# Patient Record
Sex: FEMALE | Race: WHITE
[De-identification: names, ages, dates, MRNs, and addresses within clinical notes are randomized per-mention and may not be internally consistent; named-entity substitution may affect disease eponyms.]

---

## 2018-08-30 ENCOUNTER — HOSPITAL ENCOUNTER (INPATIENT)
Dept: HOSPITAL 47 - EC | Age: 63
LOS: 2 days | Discharge: HOME | DRG: 291 | End: 2018-09-01
Attending: HOSPITALIST | Admitting: HOSPITALIST
Payer: MEDICARE

## 2018-08-30 VITALS — BODY MASS INDEX: 33.1 KG/M2

## 2018-08-30 DIAGNOSIS — M19.041: ICD-10-CM

## 2018-08-30 DIAGNOSIS — D72.829: ICD-10-CM

## 2018-08-30 DIAGNOSIS — Z95.1: ICD-10-CM

## 2018-08-30 DIAGNOSIS — E78.5: ICD-10-CM

## 2018-08-30 DIAGNOSIS — Z98.41: ICD-10-CM

## 2018-08-30 DIAGNOSIS — Z98.51: ICD-10-CM

## 2018-08-30 DIAGNOSIS — J96.01: ICD-10-CM

## 2018-08-30 DIAGNOSIS — Z98.42: ICD-10-CM

## 2018-08-30 DIAGNOSIS — I25.2: ICD-10-CM

## 2018-08-30 DIAGNOSIS — M19.031: ICD-10-CM

## 2018-08-30 DIAGNOSIS — I16.1: ICD-10-CM

## 2018-08-30 DIAGNOSIS — Z87.81: ICD-10-CM

## 2018-08-30 DIAGNOSIS — M19.032: ICD-10-CM

## 2018-08-30 DIAGNOSIS — I24.8: ICD-10-CM

## 2018-08-30 DIAGNOSIS — M19.042: ICD-10-CM

## 2018-08-30 DIAGNOSIS — Z79.01: ICD-10-CM

## 2018-08-30 DIAGNOSIS — Z79.899: ICD-10-CM

## 2018-08-30 DIAGNOSIS — Z87.891: ICD-10-CM

## 2018-08-30 DIAGNOSIS — I25.10: ICD-10-CM

## 2018-08-30 DIAGNOSIS — Z95.810: ICD-10-CM

## 2018-08-30 DIAGNOSIS — E87.2: ICD-10-CM

## 2018-08-30 DIAGNOSIS — I25.5: ICD-10-CM

## 2018-08-30 DIAGNOSIS — I11.0: Primary | ICD-10-CM

## 2018-08-30 DIAGNOSIS — Z96.1: ICD-10-CM

## 2018-08-30 DIAGNOSIS — Z79.4: ICD-10-CM

## 2018-08-30 DIAGNOSIS — Z88.8: ICD-10-CM

## 2018-08-30 DIAGNOSIS — E11.40: ICD-10-CM

## 2018-08-30 DIAGNOSIS — M16.11: ICD-10-CM

## 2018-08-30 DIAGNOSIS — I48.0: ICD-10-CM

## 2018-08-30 DIAGNOSIS — E11.65: ICD-10-CM

## 2018-08-30 DIAGNOSIS — I50.23: ICD-10-CM

## 2018-08-30 DIAGNOSIS — Z82.61: ICD-10-CM

## 2018-08-30 LAB
ALBUMIN SERPL-MCNC: 4.2 G/DL (ref 3.5–5)
ALP SERPL-CCNC: 115 U/L (ref 38–126)
ALT SERPL-CCNC: 43 U/L (ref 9–52)
ANION GAP SERPL CALC-SCNC: 16 MMOL/L
APTT BLD: 31.9 SEC (ref 22–30)
AST SERPL-CCNC: 43 U/L (ref 14–36)
BUN SERPL-SCNC: 17 MG/DL (ref 7–17)
CALCIUM SPEC-MCNC: 9.4 MG/DL (ref 8.4–10.2)
CELLS COUNTED: 100
CHLORIDE SERPL-SCNC: 102 MMOL/L (ref 98–107)
CK SERPL-CCNC: 145 U/L (ref 30–135)
CK SERPL-CCNC: 147 U/L (ref 30–135)
CK SERPL-CCNC: 173 U/L (ref 30–135)
CO2 SERPL-SCNC: 20 MMOL/L (ref 22–30)
EOSINOPHIL # BLD MANUAL: 0.14 K/UL (ref 0–0.7)
ERYTHROCYTE [DISTWIDTH] IN BLOOD BY AUTOMATED COUNT: 5.3 M/UL (ref 3.8–5.4)
ERYTHROCYTE [DISTWIDTH] IN BLOOD: 13.9 % (ref 11.5–15.5)
GLUCOSE BLD-MCNC: 185 MG/DL (ref 75–99)
GLUCOSE BLD-MCNC: 223 MG/DL (ref 75–99)
GLUCOSE BLD-MCNC: 228 MG/DL (ref 75–99)
GLUCOSE SERPL-MCNC: 257 MG/DL (ref 74–99)
HCO3 BLDV-SCNC: 23 MMOL/L (ref 24–28)
HCT VFR BLD AUTO: 47.9 % (ref 34–46)
HGB BLD-MCNC: 15 GM/DL (ref 11.4–16)
INR PPP: 2.5 (ref ?–1.2)
LYMPHOCYTES # BLD MANUAL: 6.67 K/UL (ref 1–4.8)
MAGNESIUM SPEC-SCNC: 1.8 MG/DL (ref 1.6–2.3)
MCH RBC QN AUTO: 28.3 PG (ref 25–35)
MCHC RBC AUTO-ENTMCNC: 31.3 G/DL (ref 31–37)
MCV RBC AUTO: 90.4 FL (ref 80–100)
MONOCYTES # BLD MANUAL: 1.39 K/UL (ref 0–1)
NEUTROPHILS NFR BLD MANUAL: 40 %
NEUTS SEG # BLD MANUAL: 5.6 K/UL (ref 1.3–7.7)
PCO2 BLDV: 52 MMHG (ref 37–51)
PH BLDV: 7.27 [PH] (ref 7.31–7.41)
PH UR: 5.5 [PH] (ref 5–8)
PLATELET # BLD AUTO: 279 K/UL (ref 150–450)
POTASSIUM SERPL-SCNC: 4.5 MMOL/L (ref 3.5–5.1)
PROT SERPL-MCNC: 7.8 G/DL (ref 6.3–8.2)
PT BLD: 22.8 SEC (ref 9–12)
SODIUM SERPL-SCNC: 138 MMOL/L (ref 137–145)
SP GR UR: 1 (ref 1–1.03)
TROPONIN I SERPL-MCNC: 0.01 NG/ML (ref 0–0.03)
TROPONIN I SERPL-MCNC: 0.05 NG/ML (ref 0–0.03)
TROPONIN I SERPL-MCNC: 0.06 NG/ML (ref 0–0.03)
UROBILINOGEN UR QL STRIP: <2 MG/DL (ref ?–2)
WBC # BLD AUTO: 13.9 K/UL (ref 3.8–10.6)

## 2018-08-30 PROCEDURE — 83036 HEMOGLOBIN GLYCOSYLATED A1C: CPT

## 2018-08-30 PROCEDURE — 99285 EMERGENCY DEPT VISIT HI MDM: CPT

## 2018-08-30 PROCEDURE — 81003 URINALYSIS AUTO W/O SCOPE: CPT

## 2018-08-30 PROCEDURE — 80053 COMPREHEN METABOLIC PANEL: CPT

## 2018-08-30 PROCEDURE — 85379 FIBRIN DEGRADATION QUANT: CPT

## 2018-08-30 PROCEDURE — 93005 ELECTROCARDIOGRAM TRACING: CPT

## 2018-08-30 PROCEDURE — 80061 LIPID PANEL: CPT

## 2018-08-30 PROCEDURE — 83735 ASSAY OF MAGNESIUM: CPT

## 2018-08-30 PROCEDURE — 96365 THER/PROPH/DIAG IV INF INIT: CPT

## 2018-08-30 PROCEDURE — 85025 COMPLETE CBC W/AUTO DIFF WBC: CPT

## 2018-08-30 PROCEDURE — 85730 THROMBOPLASTIN TIME PARTIAL: CPT

## 2018-08-30 PROCEDURE — 83880 ASSAY OF NATRIURETIC PEPTIDE: CPT

## 2018-08-30 PROCEDURE — 82803 BLOOD GASES ANY COMBINATION: CPT

## 2018-08-30 PROCEDURE — 85610 PROTHROMBIN TIME: CPT

## 2018-08-30 PROCEDURE — 87040 BLOOD CULTURE FOR BACTERIA: CPT

## 2018-08-30 PROCEDURE — 71045 X-RAY EXAM CHEST 1 VIEW: CPT

## 2018-08-30 PROCEDURE — 80048 BASIC METABOLIC PNL TOTAL CA: CPT

## 2018-08-30 PROCEDURE — 96375 TX/PRO/DX INJ NEW DRUG ADDON: CPT

## 2018-08-30 PROCEDURE — 82553 CREATINE MB FRACTION: CPT

## 2018-08-30 PROCEDURE — 36415 COLL VENOUS BLD VENIPUNCTURE: CPT

## 2018-08-30 PROCEDURE — 94660 CPAP INITIATION&MGMT: CPT

## 2018-08-30 PROCEDURE — 82550 ASSAY OF CK (CPK): CPT

## 2018-08-30 PROCEDURE — 93306 TTE W/DOPPLER COMPLETE: CPT

## 2018-08-30 PROCEDURE — 84484 ASSAY OF TROPONIN QUANT: CPT

## 2018-08-30 RX ADMIN — INSULIN HUMAN SCH UNIT: 100 INJECTION, SUSPENSION SUBCUTANEOUS at 17:12

## 2018-08-30 RX ADMIN — CARVEDILOL SCH MG: 12.5 TABLET, FILM COATED ORAL at 17:11

## 2018-08-30 RX ADMIN — INSULIN ASPART SCH UNIT: 100 INJECTION, SOLUTION INTRAVENOUS; SUBCUTANEOUS at 17:15

## 2018-08-30 RX ADMIN — INSULIN DETEMIR SCH UNIT: 100 INJECTION, SOLUTION SUBCUTANEOUS at 21:24

## 2018-08-30 RX ADMIN — INSULIN HUMAN SCH UNIT: 100 INJECTION, SUSPENSION SUBCUTANEOUS at 13:22

## 2018-08-30 RX ADMIN — INSULIN ASPART SCH UNIT: 100 INJECTION, SOLUTION INTRAVENOUS; SUBCUTANEOUS at 13:21

## 2018-08-30 RX ADMIN — INSULIN ASPART SCH UNIT: 100 INJECTION, SOLUTION INTRAVENOUS; SUBCUTANEOUS at 21:23

## 2018-08-30 NOTE — ED
General Adult HPI





- General


Chief complaint: Shortness of Breath


Stated complaint: SOB


Source: patient, EMS


Mode of arrival: EMS


Limitations: no limitations





- History of Present Illness


Initial comments: 





Dictation was produced using dragon dictation software. please excuse any 

grammatical, word or spelling errors. 





Chief Complaint: 63-year-old female presents via EMS for acute onset dyspnea.





History of Present Illness: Patient is 63-year-old female who was at the 

[facility] today when she began experiencing acute shortness of breath.  Patient 

has a past medical history of HPI.  Patient is severely short of breath and is 

unable to provide detailed HPI at this time.  She has history of congestive 

heart failure and CABG.  Her cardiologist she reports is Dr. Gomez.  Patient 

has not been under hospital in the past.  Aggressive medical history shows 

diabetes, dyslipidemia, hypertension, MI, heart failure.  Pacer.  EMS reports 

that patient was given 1 sublingual nitroglycerin started on noninvasive 

ventilation.  She did report improvement with an IV.  Patient denies any chest 

pain or pain symptoms.  That she felt within normal limits early this morning.








The ROS documented in this emergency department record has been reviewed and 

confirmed by me.  Those systems with pertinent positive or negative responses 

have been documented in the HPI.  All other systems are other negative and/or 

noncontributory.





- Related Data


 Home Medications











 Medication  Instructions  Recorded  Confirmed


 


Carvedilol [Coreg] 12.5 mg PO BID 08/30/18 08/30/18


 


Digoxin [Lanoxin] 125 mcg PO DAILY 08/30/18 08/30/18


 


Furosemide [Lasix] 40 mg PO DAILY 08/30/18 08/30/18


 


Insulin Detemir [Levemir Flextouch] 76 units SQ HS 08/30/18 08/30/18


 


Insulin NPH Human Isophane 20 unit SQ AC-TID 08/30/18 08/30/18





[humuLIN N Kwikpen]   


 


Lisinopril [Zestril] 2.5 mg PO DAILY 08/30/18 08/30/18


 


Multivitamins, Thera [Multivitamin 1 tab PO DAILY 08/30/18 08/30/18





(formulary)]   


 


Omega-3 Fatty Acids [Omega-3] 1,000 mg PO BID 08/30/18 08/30/18


 


Potassium Chloride ER [K-Dur 20] 20 meq PO DAILY 08/30/18 08/30/18


 


Warfarin [Coumadin] 5 mg PO SUTUWEFRSA 08/30/18 08/30/18


 


Warfarin [Coumadin] 7.5 mg PO MOTH 08/30/18 08/30/18











 Allergies











Allergy/AdvReac Type Severity Reaction Status Date / Time


 


metformin AdvReac  Nausea & Verified 08/30/18 10:38





   Vomiting &  





   Diarrhea  


 


Statins-Hmg-Coa Reductase AdvReac  Unknown Verified 08/30/18 10:38





Inhibitor     














Review of Systems


ROS Statement: 


Those systems with pertinent positive or pertinent negative responses have been 

documented in the HPI.





ROS Other: All systems not noted in ROS Statement are negative.





Past Medical History


Past Medical History: Heart Failure, Diabetes Mellitus, Hyperlipidemia, 

Hypertension, Myocardial Infarction (MI)


Additional Past Medical History / Comment(s): AICD


History of Any Multi-Drug Resistant Organisms: None Reported


Past Surgical History: AICD, Coronary Bypass/CABG


Past Psychological History: No Psychological Hx Reported


Smoking Status: Former smoker


Past Alcohol Use History: None Reported


Past Drug Use History: None Reported





General Exam





- General Exam Comments


Initial Comments: 











PHYSICAL EXAM:


General Impression: Alert and oriented x3, distress secondary to dyspnea


HEENT: Normocephalic atraumatic, extra-ocular movements intact, pupils equal 

and reactive to light bilaterally, mucous membranes moist.


Cardiovascular: Heart regular rate and rhythm, S1&S2 audible, no murmurs, rubs 

or gallops


Chest: Bilateral lung crackles


Abdomen: Bowel sounds present, abdomen soft, non-tender, non-distended, no 

organomegaly


Musculoskeletal: Pulses present and equal in all extremities, 1+ pitting edema


Motor: Power 5/5 bilaterally, no focal deficits noted


Neurological: CN II-XII grossly intact, no focal motor or sensory deficits noted


Skin: Intact with no visualized rashes


Limitations: no limitations





Course


 Vital Signs











  08/30/18 08/30/18 08/30/18





  09:11 09:23 09:35


 


Temperature  97.8 F 


 


Pulse Rate 108 H  90


 


Respiratory 26 H  20





Rate   


 


Blood Pressure 173/77  129/62


 


O2 Sat by Pulse 87 L  93 L





Oximetry   














  08/30/18 08/30/18





  09:39 10:00


 


Temperature  


 


Pulse Rate  82


 


Respiratory 22 16





Rate  


 


Blood Pressure  145/64


 


O2 Sat by Pulse  97





Oximetry  














Medical Decision Making





- Medical Decision Making











ED course: 63-year-old  female with multiple cardiac comorbidities 

presents with acute onset dyspnea.  Vital signs upon arrival shows heart rate 

of 108, respiratory rate of 26, 90% on BiPAP.  Patient presents in severe acute 

respiratory distress.  She is tolerating BiPAP.  Click or presentation 

consistent with acute CHF exacerbation.  EKG was obtained showing paced rhythm 

with ST depressions in the anterior precordial leads.  There is high suspicion 

of sgarbossa criteria being met.  No old EKG for comparison  Discussed patient 

case with Dr. Victoria who is being faxed EKG.  Patient given repeat 

sublingual nitroglycerin started on nitroglycerin drip.  Discussed EKG and 

clinical presentation of patient with Dr. Victoria.  He reviewed EKG and 

does not believe that EKG warrants Cath Lab activation at this time.  Patient 

reevaluated after repeat sublingual nitroglycerin, several minutes of BiPAP and 

Lasix with improved respiratory status.CBC was obtained showing leukocytosis of 

13.9 likely secondary to stress.  INR is therapeutic at 2.5.  Blood gases shows 

pH of 7.27 with a pCO2 of 52 with a bicarb of 23.  Blood gas consistent with 

respiratory acidosis.  Metabolic panel shows no anion gap.  Glucose of 257.  

Cardiac enzymes are negative.  She given aspirin, 40 mg of Lasix.  Patient is 

reevaluated after several minutes on nitroglycerin drip.  Patient medical 

status is dramatically improved.  Trial of BiPAP was performed in the ER.  

Patient was observed in emergency department on BiPAP.  She appears well.  She 

is having full conversations and he will complete sentences.  Patient still has 

some cracking to her lungs however she appears well.  We will have patient 

admitted for gentle diuresis, blood pressure up to position the medical 

monitoring.  Cardiology to be put on consult.








EKG Interpretation:


A 12 lead EKG was obtained. It was interpreted by myself and attending 

physician. There is a P wave before every QRS complex. Rate is 101. Rhythm is 

paced rhythm, QRS 1:30, QTc 552.  There appears to be minimal ST depressions in 

anterior precordial leads especially in V2 there is some concern of sgarbossa 

criteria





- Lab Data


Result diagrams: 


 08/30/18 09:15





 08/30/18 09:15


 Lab Results











  08/30/18 08/30/18 08/30/18 Range/Units





  09:15 09:15 09:15 


 


WBC   13.9 H   (3.8-10.6)  k/uL


 


RBC   5.30   (3.80-5.40)  m/uL


 


Hgb   15.0   (11.4-16.0)  gm/dL


 


Hct   47.9 H   (34.0-46.0)  %


 


MCV   90.4   (80.0-100.0)  fL


 


MCH   28.3   (25.0-35.0)  pg


 


MCHC   31.3   (31.0-37.0)  g/dL


 


RDW   13.9   (11.5-15.5)  %


 


Plt Count   279   (150-450)  k/uL


 


Neutrophils % (Manual)   40   %


 


Band Neutrophils %   1   %


 


Lymphocytes % (Manual)   48   %


 


Monocytes % (Manual)   10   %


 


Eosinophils % (Manual)   1   %


 


Neutrophils # (Manual)   5.60   (1.3-7.7)  k/uL


 


Lymphocytes # (Manual)   6.67 H   (1.0-4.8)  k/uL


 


Monocytes # (Manual)   1.39 H   (0-1.0)  k/uL


 


Eosinophils # (Manual)   0.14   (0-0.7)  k/uL


 


Nucleated RBCs   0   (0-0)  /100 WBC


 


Toxic Granulation   Present   


 


Poikilocytosis (manual   Present   


 


Anisocytosis (manual)   Present   


 


PT     (9.0-12.0)  sec


 


INR     (<1.2)  


 


APTT     (22.0-30.0)  sec


 


D-Dimer     (<0.60)  mg/L FEU


 


VBG pH     (7.31-7.41)  


 


VBG pCO2     (37-51)  mmHg


 


VBG HCO3     (24-28)  mmol/L


 


Sodium    138  (137-145)  mmol/L


 


Potassium    4.5  (3.5-5.1)  mmol/L


 


Chloride    102  ()  mmol/L


 


Carbon Dioxide    20 L  (22-30)  mmol/L


 


Anion Gap    16  mmol/L


 


BUN    17  (7-17)  mg/dL


 


Creatinine    0.97  (0.52-1.04)  mg/dL


 


Est GFR (CKD-EPI)AfAm    72  (>60 ml/min/1.73 sqM)  


 


Est GFR (CKD-EPI)NonAf    62  (>60 ml/min/1.73 sqM)  


 


Glucose    257 H  (74-99)  mg/dL


 


Calcium    9.4  (8.4-10.2)  mg/dL


 


Magnesium    1.8  (1.6-2.3)  mg/dL


 


Total Bilirubin    0.6  (0.2-1.3)  mg/dL


 


AST    43 H  (14-36)  U/L


 


ALT    43  (9-52)  U/L


 


Alkaline Phosphatase    115  ()  U/L


 


Total Creatine Kinase  173 H    ()  U/L


 


CK-MB (CK-2)  3.2 H*    (0.0-2.4)  ng/mL


 


CK-MB (CK-2) Rel Index  1.8    


 


Troponin I  0.014    (0.000-0.034)  ng/mL


 


NT-Pro-B Natriuret Pep     pg/mL


 


Total Protein    7.8  (6.3-8.2)  g/dL


 


Albumin    4.2  (3.5-5.0)  g/dL














  08/30/18 08/30/18 08/30/18 Range/Units





  09:15 09:15 09:15 


 


WBC     (3.8-10.6)  k/uL


 


RBC     (3.80-5.40)  m/uL


 


Hgb     (11.4-16.0)  gm/dL


 


Hct     (34.0-46.0)  %


 


MCV     (80.0-100.0)  fL


 


MCH     (25.0-35.0)  pg


 


MCHC     (31.0-37.0)  g/dL


 


RDW     (11.5-15.5)  %


 


Plt Count     (150-450)  k/uL


 


Neutrophils % (Manual)     %


 


Band Neutrophils %     %


 


Lymphocytes % (Manual)     %


 


Monocytes % (Manual)     %


 


Eosinophils % (Manual)     %


 


Neutrophils # (Manual)     (1.3-7.7)  k/uL


 


Lymphocytes # (Manual)     (1.0-4.8)  k/uL


 


Monocytes # (Manual)     (0-1.0)  k/uL


 


Eosinophils # (Manual)     (0-0.7)  k/uL


 


Nucleated RBCs     (0-0)  /100 WBC


 


Toxic Granulation     


 


Poikilocytosis (manual     


 


Anisocytosis (manual)     


 


PT   22.8 H   (9.0-12.0)  sec


 


INR   2.5 H   (<1.2)  


 


APTT   31.9 H   (22.0-30.0)  sec


 


D-Dimer     (<0.60)  mg/L FEU


 


VBG pH    7.27 L  (7.31-7.41)  


 


VBG pCO2    52 H  (37-51)  mmHg


 


VBG HCO3    23 L  (24-28)  mmol/L


 


Sodium     (137-145)  mmol/L


 


Potassium     (3.5-5.1)  mmol/L


 


Chloride     ()  mmol/L


 


Carbon Dioxide     (22-30)  mmol/L


 


Anion Gap     mmol/L


 


BUN     (7-17)  mg/dL


 


Creatinine     (0.52-1.04)  mg/dL


 


Est GFR (CKD-EPI)AfAm     (>60 ml/min/1.73 sqM)  


 


Est GFR (CKD-EPI)NonAf     (>60 ml/min/1.73 sqM)  


 


Glucose     (74-99)  mg/dL


 


Calcium     (8.4-10.2)  mg/dL


 


Magnesium     (1.6-2.3)  mg/dL


 


Total Bilirubin     (0.2-1.3)  mg/dL


 


AST     (14-36)  U/L


 


ALT     (9-52)  U/L


 


Alkaline Phosphatase     ()  U/L


 


Total Creatine Kinase     ()  U/L


 


CK-MB (CK-2)     (0.0-2.4)  ng/mL


 


CK-MB (CK-2) Rel Index     


 


Troponin I     (0.000-0.034)  ng/mL


 


NT-Pro-B Natriuret Pep  448    pg/mL


 


Total Protein     (6.3-8.2)  g/dL


 


Albumin     (3.5-5.0)  g/dL














  08/30/18 Range/Units





  09:15 


 


WBC   (3.8-10.6)  k/uL


 


RBC   (3.80-5.40)  m/uL


 


Hgb   (11.4-16.0)  gm/dL


 


Hct   (34.0-46.0)  %


 


MCV   (80.0-100.0)  fL


 


MCH   (25.0-35.0)  pg


 


MCHC   (31.0-37.0)  g/dL


 


RDW   (11.5-15.5)  %


 


Plt Count   (150-450)  k/uL


 


Neutrophils % (Manual)   %


 


Band Neutrophils %   %


 


Lymphocytes % (Manual)   %


 


Monocytes % (Manual)   %


 


Eosinophils % (Manual)   %


 


Neutrophils # (Manual)   (1.3-7.7)  k/uL


 


Lymphocytes # (Manual)   (1.0-4.8)  k/uL


 


Monocytes # (Manual)   (0-1.0)  k/uL


 


Eosinophils # (Manual)   (0-0.7)  k/uL


 


Nucleated RBCs   (0-0)  /100 WBC


 


Toxic Granulation   


 


Poikilocytosis (manual   


 


Anisocytosis (manual)   


 


PT   (9.0-12.0)  sec


 


INR   (<1.2)  


 


APTT   (22.0-30.0)  sec


 


D-Dimer  0.56  (<0.60)  mg/L FEU


 


VBG pH   (7.31-7.41)  


 


VBG pCO2   (37-51)  mmHg


 


VBG HCO3   (24-28)  mmol/L


 


Sodium   (137-145)  mmol/L


 


Potassium   (3.5-5.1)  mmol/L


 


Chloride   ()  mmol/L


 


Carbon Dioxide   (22-30)  mmol/L


 


Anion Gap   mmol/L


 


BUN   (7-17)  mg/dL


 


Creatinine   (0.52-1.04)  mg/dL


 


Est GFR (CKD-EPI)AfAm   (>60 ml/min/1.73 sqM)  


 


Est GFR (CKD-EPI)NonAf   (>60 ml/min/1.73 sqM)  


 


Glucose   (74-99)  mg/dL


 


Calcium   (8.4-10.2)  mg/dL


 


Magnesium   (1.6-2.3)  mg/dL


 


Total Bilirubin   (0.2-1.3)  mg/dL


 


AST   (14-36)  U/L


 


ALT   (9-52)  U/L


 


Alkaline Phosphatase   ()  U/L


 


Total Creatine Kinase   ()  U/L


 


CK-MB (CK-2)   (0.0-2.4)  ng/mL


 


CK-MB (CK-2) Rel Index   


 


Troponin I   (0.000-0.034)  ng/mL


 


NT-Pro-B Natriuret Pep   pg/mL


 


Total Protein   (6.3-8.2)  g/dL


 


Albumin   (3.5-5.0)  g/dL














Disposition


Clinical Impression: 


 Congestive heart failure





Disposition: ADMITTED AS IP TO THIS HOSP


Condition: Good


Referrals: 


None,Stated [Primary Care Provider] - 1-2 days


Decision Time: 11:10

## 2018-08-30 NOTE — XR
EXAMINATION TYPE: XR chest 1V portable

 

DATE OF EXAM: 8/30/2018

 

HISTORY: Shortness of breath.

 

COMPARISON: None.

 

TECHNIQUE: Single view of the chest is submitted.

 

FINDINGS:

Demonstrated are scattered senescent parenchymal change.  

 

There is no evidence for focal infiltrate. 

 

The heart is stable. Dual-lead pacer is in place.

 

Hilar and mediastinal structures are within normal limits.  

 

Degenerative changes are seen of the dorsal spine. 

 

 IMPRESSION: 

 

1.  Chronic changes without evidence for acute pulmonary disease.

## 2018-08-30 NOTE — P.HPIM
History of Present Illness


H&P Date: 08/30/18


Chief Complaint: Shortness of breath





The patient is a 63-year-old  female with a past medical history of 

congestive heart failure of unknown type, flash pulmonary edema, MI, type 2 

diabetes, pacer who presents to the ER via EMS with chief complaint of 

shortness of air. apparently this morning she was on her way to the [facility] 

to serve as a Juror when she became acutely short of breath worsened by 

ambulation, she took time to rest in the library and they subsequently called 

EMS who brought her here.  Apparently on arrival the patient was in respiratory 

distress with a significantly elevated blood pressure systolically over 200 

with reported borderline saturations in the upper 80s to low 90s.  She was 

placed on BiPAP and given nitroglycerin on route.  The patient denied any chest 

pain, diaphoresis nausea or vomiting, or lower extremity swelling.  She denied 

any recent illness, fevers chills or night sweats.  Reports her last 

hospitalization over 18 months ago.  Reports that she has had flash pulmonary 

edema previously and has been on nitro drip and in the ICU.  She usually goes 

to [facility]. 


In the ER the patient was noted to have elevated blood pressure systolically in 

the 170s, she was also noted to be hypoxic on room air at 87% and was continued 

on BiPAP with good saturations.  She was given a dose of Lasix and started on a 

nitroglycerin drip.  Chest x-ray showed no acute cardiopulmonary etiology, BNP 

was mildly elevated  448 and d-dimer was negative at 0.56.  Troponins were 

negative and EKG showed a paced rhythm with some minimal ST depression in 

anterior precordial leads.  ABG pH of 7.27 with a pCO2 of 52 with a bicarb of 

23.  Leukocytosis of 14, INR 2.5





Review of Systems





Pertinent positives and negatives as discussed in HPI, complete review of 

systems was performed and all other systems are negative





Past Medical History


Past Medical History: Heart Failure, Diabetes Mellitus, Hyperlipidemia, 

Hypertension, Myocardial Infarction (MI)


Additional Past Medical History / Comment(s): AICD


History of Any Multi-Drug Resistant Organisms: None Reported


Past Surgical History: AICD, Coronary Bypass/CABG


Past Psychological History: No Psychological Hx Reported


Smoking Status: Former smoker


Past Alcohol Use History: None Reported


Past Drug Use History: None Reported





Medications and Allergies


 Home Medications











 Medication  Instructions  Recorded  Confirmed  Type


 


Carvedilol [Coreg] 12.5 mg PO BID 08/30/18 08/30/18 History


 


Digoxin [Lanoxin] 125 mcg PO DAILY 08/30/18 08/30/18 History


 


Furosemide [Lasix] 40 mg PO DAILY 08/30/18 08/30/18 History


 


Insulin Detemir [Levemir Flextouch] 76 units SQ HS 08/30/18 08/30/18 History


 


Insulin NPH Human Isophane 20 unit SQ AC-TID 08/30/18 08/30/18 History





[humuLIN N Kwikpen]    


 


Lisinopril [Zestril] 2.5 mg PO DAILY 08/30/18 08/30/18 History


 


Multivitamins, Thera [Multivitamin 1 tab PO DAILY 08/30/18 08/30/18 History





(formulary)]    


 


Omega-3 Fatty Acids [Omega-3] 1,000 mg PO BID 08/30/18 08/30/18 History


 


Potassium Chloride ER [K-Dur 20] 20 meq PO DAILY 08/30/18 08/30/18 History


 


Warfarin [Coumadin] 5 mg PO SUTUWEFRSA 08/30/18 08/30/18 History


 


Warfarin [Coumadin] 7.5 mg PO MOTH 08/30/18 08/30/18 History











 Allergies











Allergy/AdvReac Type Severity Reaction Status Date / Time


 


metformin AdvReac  Nausea & Verified 08/30/18 10:38





   Vomiting &  





   Diarrhea  


 


Statins-Hmg-Coa Reductase AdvReac  Unknown Verified 08/30/18 10:38





Inhibitor     














Physical Exam


Vitals: 


 Vital Signs











  Temp Pulse Resp BP Pulse Ox


 


 08/30/18 10:00   82  16  145/64  97


 


 08/30/18 09:39    22  


 


 08/30/18 09:35   90  20  129/62  93 L


 


 08/30/18 09:23  97.8 F    


 


 08/30/18 09:11   108 H  26 H  173/77  87 L








 Intake and Output











 08/29/18 08/30/18 08/30/18





 22:59 06:59 14:59


 


Other:   


 


  Weight   81.647 kg














Constitutional: No acute distress, conversant, pleasant





Eyes: Anicteric sclerae, moist conjunctiva, no lid-lag, PERRLA





ENMT: NC/AT,Oropharynx clear, no erythema, exudates





Neck:Supple, FROM, no masses, or JVD, No carotid bruits; No thyromegaly





Lungs: Clear to auscultation, Clear to percussion, Normal respiratory effort, 

no accessory muscle use 





Cardiovascular: Heart regular in rate and rhythm,  No murmurs, gallops, or rubs 

no peripheral edema





Abdominal: Soft Nontender, nom distended, no guarding, no rebound or  rigidity, 

Normoactive bowel sounds No hepatomegaly, No splenomegaly,  No palpable mass No 

abdominal wall hernia noted 





Skin: Normal temperature, tone, texture, turgor, No induration No subcutaneous 

nodules, No rash, lesions, No ulcers





Extremities:No digital cyanosis No clubbing, Pedal pulses intact and  

symmetrical Radial pulses intact and symmetrical Normal gait and station, No 

calf tenderness


 


Psychiatric: Alert and oriented to person, place and time, Appropriate affect 

Intact judgement   


      


Neuro: Muscles Strength 5/5 in all 4 extremities, Sensation to light touch 

grossly present throughout, Cranial nerves II-XII grossly intact. No focal 

sensory deficits








Results


CBC & Chem 7: 


 08/30/18 09:15





 08/30/18 09:15


Labs: 


 Abnormal Lab Results - Last 24 Hours (Table)











  08/30/18 08/30/18 08/30/18 Range/Units





  09:15 09:15 09:15 


 


WBC   13.9 H   (3.8-10.6)  k/uL


 


Hct   47.9 H   (34.0-46.0)  %


 


Lymphocytes # (Manual)   6.67 H   (1.0-4.8)  k/uL


 


Monocytes # (Manual)   1.39 H   (0-1.0)  k/uL


 


PT     (9.0-12.0)  sec


 


INR     (<1.2)  


 


APTT     (22.0-30.0)  sec


 


VBG pH     (7.31-7.41)  


 


VBG pCO2     (37-51)  mmHg


 


VBG HCO3     (24-28)  mmol/L


 


Carbon Dioxide    20 L  (22-30)  mmol/L


 


Glucose    257 H  (74-99)  mg/dL


 


AST    43 H  (14-36)  U/L


 


Total Creatine Kinase  173 H    ()  U/L


 


CK-MB (CK-2)  3.2 H*    (0.0-2.4)  ng/mL














  08/30/18 08/30/18 Range/Units





  09:15 09:15 


 


WBC    (3.8-10.6)  k/uL


 


Hct    (34.0-46.0)  %


 


Lymphocytes # (Manual)    (1.0-4.8)  k/uL


 


Monocytes # (Manual)    (0-1.0)  k/uL


 


PT  22.8 H   (9.0-12.0)  sec


 


INR  2.5 H   (<1.2)  


 


APTT  31.9 H   (22.0-30.0)  sec


 


VBG pH   7.27 L  (7.31-7.41)  


 


VBG pCO2   52 H  (37-51)  mmHg


 


VBG HCO3   23 L  (24-28)  mmol/L


 


Carbon Dioxide    (22-30)  mmol/L


 


Glucose    (74-99)  mg/dL


 


AST    (14-36)  U/L


 


Total Creatine Kinase    ()  U/L


 


CK-MB (CK-2)    (0.0-2.4)  ng/mL














Assessment and Plan


(1) Acute respiratory failure with hypoxia


Current Visit: Yes   Status: Acute   Code(s): J96.01 - ACUTE RESPIRATORY 

FAILURE WITH HYPOXIA   SNOMED Code(s): 48179119


   





(2) Hypertensive emergency


Current Visit: Yes   Status: Acute   Code(s): I16.1 - HYPERTENSIVE EMERGENCY   

SNOMED Code(s): 935747446133133


   





(3) CHF exacerbation


Current Visit: Yes   Status: Acute   Code(s): I50.9 - HEART FAILURE, 

UNSPECIFIED   SNOMED Code(s): 33647297


   





(4) Type 2 diabetes mellitus with hyperglycemia


Current Visit: Yes   Status: Acute   Code(s): E11.65 - TYPE 2 DIABETES MELLITUS 

WITH HYPERGLYCEMIA   SNOMED Code(s): 098779975822394


   





(5) Leukocytosis


Current Visit: Yes   Status: Acute   Code(s): D72.829 - ELEVATED WHITE BLOOD 

CELL COUNT, UNSPECIFIED   SNOMED Code(s): 309924127


   





(6) Hyperlipidemia


Current Visit: Yes   Status: Acute   Code(s): E78.5 - HYPERLIPIDEMIA, 

UNSPECIFIED   SNOMED Code(s): 92644204


   


Plan: 





The patient is admitted anticipated greater than 2 midnight stay with acute 

respiratory failure with hypoxia, hypertensive emergency with concern for 

impending CHF exacerbation unknown type after presenting with acute dyspnea and 

hypoxia.  She was started on nitroglycerin drip blood pressures are now as much 

improved we'll try to wean her off and transition her to oral antihypertensive 

regimen.  Her respiratory status is also improved we'll attempt to wean her off 

BiPAP as she is transitioned to selective unit.  The patient is afebrile but 

does have a leukocytosis we'll attempt to get urinalysis, blood culture, we'll 

hold off on antibiotics at this time and monitor closely.  We'll resume her 

home Lasix, coreg, digoxin and Coumadin regimen, insulin regimen with Accu-

Cheks and correctional scale coverage.  Will check echocardiogram consult 

cardiology for further recommendations and continue to follow her clinical 

course








CODE STATUS


Full code


Medical decision maker : self/


Discussed with patient/ and son


Anticipate discharge to home in 1-2 days

## 2018-08-30 NOTE — P.CRDCN
History of Present Illness


Consult date: 18


History of present illness: 


This is a 63-year-old female with history of ischemic heart disease with 

previous bypass surgery, probable cardiomyopathy with history of biventricular 

pacemaker implantation with a defibrillator, who came to [place] to the 

[facility] on jury duty.  Apparently patient became short of breath and went to 

sit in the library.  This shortness of breath did not improve and patient 

called paramedics and came to the hospital.  Patient was extremely short of 

breath on arrival.  Chest x-ray showed some chronic changes.  Her BNP is not 

elevated.  However, patient responded well to IV Lasix.  Patient blood pressure 

was also very high.  IV nitroglycerin dropped her blood pressure to normal 

range.  Currently, patient is off nitroglycerin.  She seemed to be comfortable, 

sleeping flat in bed.  No evidence of JVD.  Lungs appeared to be clear.  

Patient is slightly tachycardic.  EKG showed evidence of biventricular 

pacemaker rhythm.  We'll continue current medical therapy.  Echocardiogram is 

done.  We'll also follow Cardec enzymes studies








Review of Systems





As per the chart





Past Medical History


Past Medical History: Atrial Fibrillation, Heart Failure, Diabetes Mellitus, 

Hyperlipidemia, Hypertension, Myocardial Infarction (MI), Osteoarthritis (OA)


Additional Past Medical History / Comment(s): Cardiomyopathy with AICD/pacer, 

inferior wall MI in -no chest pain-felt very tired, paroxysmal Afib, IDDM 

type II, diabetic neuropathy bilateral feet, arthritis bilateral hands/fingers/

wrists and R hip, past L patellar fracture, past R wrist fracture.


Last Myocardial Infarction Date:: 


History of Any Multi-Drug Resistant Organisms: None Reported


Past Surgical History: AICD, Coronary Bypass/CABG, Heart Catheterization, 

Orthopedic Surgery, Tubal Ligation


Additional Past Surgical History / Comment(s): AICD/pacer x 3 with last device 

place 8/3/16 [facility], 2010 CABG done at [facility], colonoscopy, R wrist 

fracture realignment, bilateral cataract removals/lens implants.


Past Anesthesia/Blood Transfusion Reactions: No Reported Reaction


Type of Cardiac Device: Permanent Pacemaker, AICD


Device Placement Date:: 8/3/16


Smoking Status: Former smoker





- Past Family History


  ** Father


Additional Family Medical History / Comment(s): Father  in his 80s and pt 

thinks it was from heart problems.





  ** Mother


Family Medical History: Osteoarthritis (OA)


Additional Family Medical History / Comment(s): Mother has arthritis.  She is 

in her 80s.





Medications and Allergies


 Home Medications











 Medication  Instructions  Recorded  Confirmed  Type


 


Carvedilol [Coreg] 12.5 mg PO BID 18 History


 


Digoxin [Lanoxin] 125 mcg PO DAILY 18 History


 


Furosemide [Lasix] 40 mg PO DAILY 18 History


 


Insulin Detemir [Levemir Flextouch] 76 units SQ HS 18 History


 


Insulin NPH Human Isophane 20 unit SQ AC-TID 18 History





[humuLIN N Kwikpen]    


 


Lisinopril [Zestril] 2.5 mg PO DAILY 18 History


 


Multivitamins, Thera [Multivitamin 1 tab PO DAILY 18 History





(formulary)]    


 


Omega-3 Fatty Acids [Omega-3] 1,000 mg PO BID 18 History


 


Potassium Chloride ER [K-Dur 20] 20 meq PO DAILY 18 History


 


Warfarin [Coumadin] 5 mg PO SUTUWEFRSA 18 History


 


Warfarin [Coumadin] 7.5 mg PO MOTH 18 History











 Allergies











Allergy/AdvReac Type Severity Reaction Status Date / Time


 


metformin AdvReac  Nausea & Verified 18 10:38





   Vomiting &  





   Diarrhea  


 


Statins-Hmg-Coa Reductase AdvReac  Unknown Verified 18 10:38





Inhibitor     














Physical Exam


Vitals: 


 Vital Signs











  Temp Pulse Resp BP Pulse Ox


 


 18 11:57  97 F L  85  18  162/71  95


 


 18 10:00   82  16  145/64  97


 


 18 09:39    22  


 


 18 09:35   90  20  129/62  93 L


 


 18 09:23  97.8 F    


 


 18 09:11   108 H  26 H  173/77  87 L








 Intake and Output











 18





 06:59 14:59 22:59


 


Intake Total  200 


 


Output Total  200 


 


Balance  0 


 


Intake:   


 


  Oral  200 


 


Output:   


 


  Urine  200 


 


Other:   


 


  # Voids  1 


 


  Weight  81.647 kg 














GENERAL EXAM: Patient is alert and oriented and doesn't appear to be in any 

acute distress


HEENT: Normocephalic. Normal reaction of pupils, equal size, normal range of 

extraocular motion. No erythema or exudates in the throat.


NECK: No masses, no nuchal rigidity.


CHEST: No chest wall deformity.


LUNGS: Equal air entry with no crackles or wheeze.


HEART: S1 and S2 normal with no audible mumurs or gallops. Regular rhythm, 

femorals equal on both sides..


ABDOMEN: No hepatosplenomegaly, normal bowel sounds, no guarding or rigidity.


SKIN: No rashes


CENTRAL NERVOUS SYSTEM: No focal deficits.


EXTREMITIES: No cyanosis, clubbing or edema.





Results





 18 09:15





 18 09:15


 Cardiac Enzymes











  18 Range/Units





  09:15 09:15 


 


AST   43 H  (14-36)  U/L


 


CK-MB (CK-2)  3.2 H*   (0.0-2.4)  ng/mL


 


Troponin I  0.014   (0.000-0.034)  ng/mL








 Coagulation











  18 Range/Units





  09:15 


 


PT  22.8 H  (9.0-12.0)  sec


 


APTT  31.9 H  (22.0-30.0)  sec








 CBC











  18 Range/Units





  09:15 


 


WBC  13.9 H  (3.8-10.6)  k/uL


 


RBC  5.30  (3.80-5.40)  m/uL


 


Hgb  15.0  (11.4-16.0)  gm/dL


 


Hct  47.9 H  (34.0-46.0)  %


 


Plt Count  279  (150-450)  k/uL








 Comprehensive Metabolic Panel











  18 Range/Units





  09:15 


 


Sodium  138  (137-145)  mmol/L


 


Potassium  4.5  (3.5-5.1)  mmol/L


 


Chloride  102  ()  mmol/L


 


Carbon Dioxide  20 L  (22-30)  mmol/L


 


BUN  17  (7-17)  mg/dL


 


Creatinine  0.97  (0.52-1.04)  mg/dL


 


Glucose  257 H  (74-99)  mg/dL


 


Calcium  9.4  (8.4-10.2)  mg/dL


 


AST  43 H  (14-36)  U/L


 


ALT  43  (9-52)  U/L


 


Alkaline Phosphatase  115  ()  U/L


 


Total Protein  7.8  (6.3-8.2)  g/dL


 


Albumin  4.2  (3.5-5.0)  g/dL








 Current Medications











Generic Name Dose Route Start Last Admin





  Trade Name Freq  PRN Reason Stop Dose Admin


 


Aspirin  325 mg  18 09:00  





  Aspirin  PO   





  DAILY [facility]   





     





     





     





     


 


Carvedilol  12.5 mg  18 17:30  





  Coreg  PO   





  BID-W/MEALS [facility]   





     





     





     





     


 


Digoxin  125 mcg  18 09:00  





  Lanoxin  PO   





  DAILY [facility]   





     





     





     





     


 


Furosemide  40 mg  18 09:00  





  Lasix  PO   





  DAILY [facility]   





     





     





     





     


 


Insulin Aspart  0 unit  18 12:30  18 13:21





  Novolog  SQ   2 unit





  ACHS [facility]   Administration





     





     





  Protocol   





     


 


Insulin Detemir  76 unit  18 21:00  





  Levemir  SQ   





  HS [facility]   





     





     





     





     


 


Insulin Human NPH  20 unit  18 12:30  18 13:22





  Humulin N  SQ   20 unit





  AC-TID [facility]   Administration





     





     





     





     


 


Lisinopril  2.5 mg  18 09:00  





  Zestril  PO   





  DAILY [facility]   





     





     





     





     


 


Multivitamins  1 each  18 12:00  





  Theragran  PO   





  1200 [facility]   





     





     





     





     


 


Naloxone HCl  0.2 mg  18 11:10  





  Narcan  IV   





  Q2M PRN   





  Opioid Reversal   





     





     





     


 


Potassium Chloride  20 meq  18 09:00  





  K-Dur 20  PO   





  DAILY [facility]   





     





     





     





     


 


Warfarin Sodium  5 mg  18 18:00  





  Coumadin  PO   





  SuTuWeFrSa@1800 [facility]   





     





     





     





     


 


Warfarin Sodium  7.5 mg  18 18:00  





  Coumadin  PO   





  MoTh@1800 [facility]   





     





     





     





     








 Intake and Output











 18





 06:59 14:59 22:59


 


Intake Total  200 


 


Output Total  200 


 


Balance  0 


 


Intake:   


 


  Oral  200 


 


Output:   


 


  Urine  200 


 


Other:   


 


  # Voids  1 


 


  Weight  81.647 kg 








 Patient Weight











 18





 06:59


 


Weight 81.647 kg








 





 18 09:15 





 18 09:15 











EKG Interpretations (text)





Pacemaker rhythm, appears to be biventricular pacemaker





Assessment and Plan


(1) Shortness of breath


Current Visit: Yes   Status: Acute   Code(s): R06.02 - SHORTNESS OF BREATH   

SNOMED Code(s): 003297468


   





(2) CHF exacerbation


Current Visit: Yes   Status: Acute   Code(s): I50.9 - HEART FAILURE, 

UNSPECIFIED   SNOMED Code(s): 02040254


   





(3) Hyperlipidemia


Current Visit: Yes   Status: Acute   Code(s): E78.5 - HYPERLIPIDEMIA, 

UNSPECIFIED   SNOMED Code(s): 37131293


   





(4) Hypertensive emergency


Current Visit: Yes   Status: Acute   Code(s): I16.1 - HYPERTENSIVE EMERGENCY   

SNOMED Code(s): 746303775997909


   





(5) Ischemic heart disease


Current Visit: Yes   Status: Acute   Code(s): I25.9 - CHRONIC ISCHEMIC HEART 

DISEASE, UNSPECIFIED   SNOMED Code(s): 663697382


   





(6) Cardiomyopathy


Current Visit: Yes   Status: Acute   Code(s): I42.9 - CARDIOMYOPATHY, 

UNSPECIFIED   SNOMED Code(s): 21297614


   





(7) History of atrial fibrillation


Current Visit: Yes   Status: Acute   Code(s): Z86.79 - PERSONAL HISTORY OF 

OTHER DISEASES OF THE CIRCULATORY SYSTEM   SNOMED Code(s): 836064011


   





(8) Presence of biventricular AICD


Current Visit: Yes   Status: Acute   Code(s): Z95.810 - PRESENCE OF AUTOMATIC (

IMPLANTABLE) CARDIAC DEFIBRILLATOR   SNOMED Code(s): 745513080


   





(9) Acute on chronic systolic (congestive) heart failure


Current Visit: Yes   Status: Acute   Code(s): I50.23 - ACUTE ON CHRONIC 

SYSTOLIC (CONGESTIVE) HEART FAILURE   SNOMED Code(s): 619951400


   


Plan: 


Continue current medical therapy.  Echocardiogram.  Follow cardiac enzymes 

studies.  If stable, patient may be able to discharge within next 24-48 hours

## 2018-08-31 LAB
ANION GAP SERPL CALC-SCNC: 9 MMOL/L
BASOPHILS # BLD AUTO: 0.1 K/UL (ref 0–0.2)
BASOPHILS NFR BLD AUTO: 1 %
BUN SERPL-SCNC: 19 MG/DL (ref 7–17)
CALCIUM SPEC-MCNC: 9.3 MG/DL (ref 8.4–10.2)
CHLORIDE SERPL-SCNC: 103 MMOL/L (ref 98–107)
CHOLEST SERPL-MCNC: 191 MG/DL (ref ?–200)
CO2 SERPL-SCNC: 28 MMOL/L (ref 22–30)
EOSINOPHIL # BLD AUTO: 0.2 K/UL (ref 0–0.7)
EOSINOPHIL NFR BLD AUTO: 3 %
ERYTHROCYTE [DISTWIDTH] IN BLOOD BY AUTOMATED COUNT: 4.57 M/UL (ref 3.8–5.4)
ERYTHROCYTE [DISTWIDTH] IN BLOOD: 13.8 % (ref 11.5–15.5)
GLUCOSE BLD-MCNC: 145 MG/DL (ref 75–99)
GLUCOSE BLD-MCNC: 197 MG/DL (ref 75–99)
GLUCOSE BLD-MCNC: 242 MG/DL (ref 75–99)
GLUCOSE BLD-MCNC: 82 MG/DL (ref 75–99)
GLUCOSE SERPL-MCNC: 79 MG/DL (ref 74–99)
HBA1C MFR BLD: 7.8 % (ref 4–6)
HCT VFR BLD AUTO: 40.4 % (ref 34–46)
HDLC SERPL-MCNC: 32 MG/DL (ref 40–60)
HGB BLD-MCNC: 12.9 GM/DL (ref 11.4–16)
LDLC SERPL CALC-MCNC: 83 MG/DL (ref 0–99)
LYMPHOCYTES # SPEC AUTO: 3.5 K/UL (ref 1–4.8)
LYMPHOCYTES NFR SPEC AUTO: 39 %
MCH RBC QN AUTO: 28.3 PG (ref 25–35)
MCHC RBC AUTO-ENTMCNC: 32 G/DL (ref 31–37)
MCV RBC AUTO: 88.3 FL (ref 80–100)
MONOCYTES # BLD AUTO: 0.6 K/UL (ref 0–1)
MONOCYTES NFR BLD AUTO: 6 %
NEUTROPHILS # BLD AUTO: 4.4 K/UL (ref 1.3–7.7)
NEUTROPHILS NFR BLD AUTO: 48 %
PLATELET # BLD AUTO: 206 K/UL (ref 150–450)
POTASSIUM SERPL-SCNC: 3.8 MMOL/L (ref 3.5–5.1)
SODIUM SERPL-SCNC: 140 MMOL/L (ref 137–145)
TRIGL SERPL-MCNC: 381 MG/DL (ref ?–150)
WBC # BLD AUTO: 9 K/UL (ref 3.8–10.6)

## 2018-08-31 RX ADMIN — INSULIN ASPART SCH UNIT: 100 INJECTION, SOLUTION INTRAVENOUS; SUBCUTANEOUS at 17:35

## 2018-08-31 RX ADMIN — CARVEDILOL SCH MG: 12.5 TABLET, FILM COATED ORAL at 17:35

## 2018-08-31 RX ADMIN — INSULIN ASPART SCH UNIT: 100 INJECTION, SOLUTION INTRAVENOUS; SUBCUTANEOUS at 21:08

## 2018-08-31 RX ADMIN — THERA TABS SCH EACH: TAB at 08:42

## 2018-08-31 RX ADMIN — FUROSEMIDE SCH MG: 40 TABLET ORAL at 08:42

## 2018-08-31 RX ADMIN — CARVEDILOL SCH MG: 12.5 TABLET, FILM COATED ORAL at 08:41

## 2018-08-31 RX ADMIN — INSULIN HUMAN SCH UNIT: 100 INJECTION, SUSPENSION SUBCUTANEOUS at 17:36

## 2018-08-31 RX ADMIN — LISINOPRIL SCH MG: 2.5 TABLET ORAL at 08:42

## 2018-08-31 RX ADMIN — INSULIN HUMAN SCH: 100 INJECTION, SUSPENSION SUBCUTANEOUS at 10:53

## 2018-08-31 RX ADMIN — POTASSIUM CHLORIDE SCH MEQ: 20 TABLET, EXTENDED RELEASE ORAL at 08:42

## 2018-08-31 RX ADMIN — INSULIN ASPART SCH UNIT: 100 INJECTION, SOLUTION INTRAVENOUS; SUBCUTANEOUS at 12:34

## 2018-08-31 RX ADMIN — INSULIN DETEMIR SCH UNIT: 100 INJECTION, SOLUTION SUBCUTANEOUS at 21:08

## 2018-08-31 RX ADMIN — INSULIN ASPART SCH: 100 INJECTION, SOLUTION INTRAVENOUS; SUBCUTANEOUS at 06:20

## 2018-08-31 RX ADMIN — ASPIRIN 325 MG ORAL TABLET SCH MG: 325 PILL ORAL at 08:41

## 2018-08-31 RX ADMIN — INSULIN HUMAN SCH UNIT: 100 INJECTION, SUSPENSION SUBCUTANEOUS at 12:35

## 2018-08-31 RX ADMIN — DIGOXIN SCH MCG: 125 TABLET ORAL at 08:42

## 2018-08-31 NOTE — ECHOF
Referral Reason:CHF



MEASUREMENTS

--------

HEIGHT: 152.4 cm

WEIGHT: 81.7 kg

BP: 

IVSd:   1.1 cm     (0.6 - 1.1)

LVIDd:   5.6 cm     (3.9 - 5.3)

LVPWd:   1.5 cm     (0.6 - 1.1)

IVSs:   1.4 cm

LVIDs:   4.6 cm

LVPWs:   1.5 cm

LAESV Index (A-L):   34.10 ml/m

Ao Diam:   2.3 cm     (2.0 - 3.7)

AV Cusp:   1.7 cm     (1.5 - 2.6)

LA Diam:   3.7 cm     (2.7 - 3.8)

MV E Jesus:   1.13 m/s

MV DecT:   152 ms

MV A Jesus:   0.87 m/s

MV E/A Ratio:   1.29 

AR PHT:   439 ms

RAP:   5.00 mmHg

RVSP:   37.16 mmHg







FINDINGS

--------

Paced rhythm.

This was a techncally difficult study with suboptimal views, , Lumason utilized for enhancement of im
ages.

The left ventricular size is normal.   There is borderline concentric left ventricular hypertrophy.  
 Overall left ventricular systolic function is severely impaired with, an EF between 25 - 30 %.   Bas
al lateral LV wall motion is akinetic.    Basal posterior LV wall motion is akinetic.    Basal inferi
or LV wall motion is akinetic.    Basal inferoseptal LV wall motion is akinetic.    Mid lateral LV wa
ll motion is akinetic.    Mid posterior LV wall motion is akinetic.    Mid inferior LV wall motion is
 akinetic.    Mid inferoseptal LV wall motion is akinetic.    Apical inferior LV wall motion is akine
tic.    Apical septum LV wall motion is akinetic.

The right ventricle is normal in size.

LA is midly dilated 29-33ml/m2.

The right atrial size is normal.

5.0mg OF Lumason UTLIZED: 2 OR MORE WALL SEGMENTS NOT VISUALIZED.

There is mild aortic valve sclerosis.   There is mild aortic regurgitation.

Mild mitral annular calcification present.   Mild mitral regurgitation is present.

Mild tricuspid regurgitation present.   There is mild pulmonary hypertension.   The right ventricular
 systolic pressure, as measured by Doppler, is 37.16mmHg.

Trace/mild (physiologic)  pulmonic regurgitation.

The aortic root size is normal.

There is no pericardial effusion.



CONCLUSIONS

--------

1. This was a techncally difficult study with suboptimal views, , Lumason utilized for enhancement of
 images.

2. The left ventricular size is normal.

3. There is borderline concentric left ventricular hypertrophy.

4. Overall left ventricular systolic function is severely impaired with, an EF between 25 - 30 %.

5. Basal lateral LV wall motion is akinetic.

6. Basal posterior LV wall motion is akinetic.

7. Basal inferior LV wall motion is akinetic.

8. Basal inferoseptal LV wall motion is akinetic.

9. Mid lateral LV wall motion is akinetic.

10. Mid posterior LV wall motion is akinetic.

11. Mid inferior LV wall motion is akinetic.

12. Mid inferoseptal LV wall motion is akinetic.

13. Apical inferior LV wall motion is akinetic.

14. Apical septum LV wall motion is akinetic.

15. LA is midly dilated 29-33ml/m2.

16. 5.0mg OF Lumason UTLIZED: 2 OR MORE WALL SEGMENTS NOT VISUALIZED.

17. There is mild aortic valve sclerosis.

18. There is mild aortic regurgitation.

19. Mild mitral annular calcification present.

20. Mild mitral regurgitation is present.

21. Mild tricuspid regurgitation present.

22. There is mild pulmonary hypertension.

23. Trace/mild (physiologic)  pulmonic regurgitation.

24. The aortic root size is normal.

25. There is no pericardial effusion.





SONOGRAPHER: Talita Ramsey RDCS

## 2018-08-31 NOTE — P.PN
Subjective


Progress Note Date: 08/31/18





This is a 63-year-old female with history of ischemic heart disease with 

previous bypass surgery, probable cardiomyopathy with history of biventricular 

pacemaker implantation with a defibrillator, who came to [place] to the 

[facility] on jury duty.  Apparently patient became short of breath and went to 

sit in the library.  This shortness of breath did not improve and patient 

called paramedics and came to the hospital.  Patient was extremely short of 

breath on arrival.  Chest x-ray showed some chronic changes.  Her BNP is not 

elevated.  However, patient responded well to IV Lasix.  Patient blood pressure 

was also very high.  IV nitroglycerin dropped her blood pressure to normal 

range.  Currently, patient is off nitroglycerin.  She seemed to be comfortable, 

sleeping flat in bed.  No evidence of JVD.  Lungs appeared to be clear.  

Troponins 0.014, 0.05, 0.05, 0.05.  She denies having any chest discomfort 

today and her breathing overall is stable.  Blood pressure 120/60 with a heart 

rate in the 80s, 92% on room air.  We will continue to monitor the patient, she 

wishes to follow-up with her cardiologist on discharge, we'll plan on sending 

her home in 24 hours if stable





Objective





- Vital Signs


Vital signs: 


 Vital Signs











Temp  97.3 F L  08/31/18 08:00


 


Pulse  80   08/31/18 12:00


 


Resp  18   08/31/18 12:00


 


BP  120/59   08/31/18 08:00


 


Pulse Ox  92 L  08/31/18 08:00








 Intake & Output











 08/30/18 08/31/18 08/31/18





 18:59 06:59 18:59


 


Intake Total 200 550 180


 


Output Total 200  


 


Balance 0 550 180


 


Weight 81.647 kg 79.5 kg 79.5 kg


 


Intake:   


 


  Oral 200 550 180


 


Output:   


 


  Urine 200  


 


Other:   


 


  Voiding Method Toilet Toilet Toilet


 


  # Voids 1 3 














- Exam





GENERAL EXAM: Patient is alert and oriented and doesn't appear to be in any 

acute distress


HEENT: Normocephalic. Normal reaction of pupils, equal size, normal range of 

extraocular motion. No erythema or exudates in the throat.


NECK: No masses, no nuchal rigidity.


CHEST: No chest wall deformity.


LUNGS: Equal air entry with no crackles or wheeze.


HEART: S1 and S2 normal with no audible mumurs or gallops. Regular rhythm, 

femorals equal on both sides..


ABDOMEN: No hepatosplenomegaly, normal bowel sounds, no guarding or rigidity.


SKIN: No rashes


CENTRAL NERVOUS SYSTEM: No focal deficits.


EXTREMITIES: No cyanosis, clubbing or edema.








- Labs


CBC & Chem 7: 


 08/31/18 05:53





 08/31/18 05:53


Labs: 


 Abnormal Lab Results - Last 24 Hours (Table)











  08/30/18 08/30/18 08/30/18 Range/Units





  14:59 16:56 20:55 


 


BUN     (7-17)  mg/dL


 


POC Glucose (mg/dL)   223 H   (75-99)  mg/dL


 


Total Creatine Kinase  145 H   147 H  ()  U/L


 


CK-MB (CK-2)  3.5 H*   3.1 H*  (0.0-2.4)  ng/mL


 


Troponin I  0.050 H*   0.059 H*  (0.000-0.034)  ng/mL


 


Triglycerides     (<150)  mg/dL


 


HDL Cholesterol     (40-60)  mg/dL














  08/30/18 08/31/18 08/31/18 Range/Units





  21:04 05:53 05:53 


 


BUN   19 H   (7-17)  mg/dL


 


POC Glucose (mg/dL)  228 H    (75-99)  mg/dL


 


Total Creatine Kinase     ()  U/L


 


CK-MB (CK-2)     (0.0-2.4)  ng/mL


 


Troponin I    0.055 H*  (0.000-0.034)  ng/mL


 


Triglycerides   381 H   (<150)  mg/dL


 


HDL Cholesterol   32 L   (40-60)  mg/dL














  08/31/18 Range/Units





  11:35 


 


BUN   (7-17)  mg/dL


 


POC Glucose (mg/dL)  242 H  (75-99)  mg/dL


 


Total Creatine Kinase   ()  U/L


 


CK-MB (CK-2)   (0.0-2.4)  ng/mL


 


Troponin I   (0.000-0.034)  ng/mL


 


Triglycerides   (<150)  mg/dL


 


HDL Cholesterol   (40-60)  mg/dL














Assessment and Plan


Plan: 


Assessment and plan


#1 systolic congestive heart failure acute on chronic


#2 ischemic cardiomyopathy


#3 history of IV AICD


#4 hyperlipidemia


#5 hypertension


#6 paroxysmal atrial fibrillation


#7 diabetes


#8 coronary disease with prior bypass surgery


#9 abnormal troponins, not consistent with acute coronary syndrome, likely 

secondary to supply and demand mismatch.








Plan


Echocardiogram with Doppler study revealed an ejection fraction of 25-30%.  We 

will continue to monitor the patient for 24 hours, if stable discharge the 

morning and arrange follow-up with her cardiologist as an outpatient.





DNP note has been reviewed, I agree with a documented findings and plan of 

care.  Patient was seen and examined.

## 2018-08-31 NOTE — P.PN
Subjective


Progress Note Date: 08/31/18





Patient feeling much better today has been chest pain-free since admission.  

Reports her shortness of breath is improved, currently doing good, oxygen 

saturations on room air.  Has been up and ambulatory.  She did have elevation 

of her troponin as high as 0.059.  Echocardiogram confirming cardiomyopathy 

likely ischemic with ejection fraction of 25-30%.  Otherwise no acute events 

overnight





Objective





- Vital Signs


Vital signs: 


 Vital Signs











Temp  97.3 F L  08/31/18 08:00


 


Pulse  80   08/31/18 08:00


 


Resp  18   08/31/18 08:00


 


BP  120/59   08/31/18 08:00


 


Pulse Ox  92 L  08/31/18 08:00








 Intake & Output











 08/30/18 08/31/18 08/31/18





 18:59 06:59 18:59


 


Intake Total 200 550 180


 


Output Total 200  


 


Balance 0 550 180


 


Weight 81.647 kg 79.5 kg 


 


Intake:   


 


  Oral 200 550 180


 


Output:   


 


  Urine 200  


 


Other:   


 


  Voiding Method Toilet Toilet Toilet


 


  # Voids 1 3 














- Exam





Constitutional: No acute distress, conversant, pleasant





Eyes: Anicteric sclerae, moist conjunctiva, no lid-lag, PERRLA





ENMT: NC/AT,Oropharynx clear, no erythema, exudates





Neck:Supple, FROM, no masses, or JVD, No carotid bruits; No thyromegaly





Lungs: Clear to auscultation, Clear to percussion, Normal respiratory effort, 

no accessory muscle use 





Cardiovascular: Heart regular in rate and rhythm,  No murmurs, gallops, or rubs 

no peripheral edema





Abdominal: Soft Nontender, nom distended, no guarding, no rebound or  rigidity, 

Normoactive bowel sounds No hepatomegaly, No splenomegaly,  No palpable mass No 

abdominal wall hernia noted 





Skin: Normal temperature, tone, texture, turgor, No induration No subcutaneous 

nodules, No rash, lesions, No ulcers





Extremities:No digital cyanosis No clubbing, Pedal pulses intact and  

symmetrical Radial pulses intact and symmetrical Normal gait and station, No 

calf tenderness


 


Psychiatric: Alert and oriented to person, place and time, Appropriate affect 

Intact judgement   


      


Neuro: Muscles Strength 5/5 in all 4 extremities, Sensation to light touch 

grossly present throughout, Cranial nerves II-XII grossly intact. No focal 

sensory deficits








- Labs


CBC & Chem 7: 


 08/31/18 05:53





 08/31/18 05:53


Labs: 


 Abnormal Lab Results - Last 24 Hours (Table)











  08/30/18 08/30/18 08/30/18 Range/Units





  14:59 16:56 20:55 


 


BUN     (7-17)  mg/dL


 


POC Glucose (mg/dL)   223 H   (75-99)  mg/dL


 


Total Creatine Kinase  145 H   147 H  ()  U/L


 


CK-MB (CK-2)  3.5 H*   3.1 H*  (0.0-2.4)  ng/mL


 


Troponin I  0.050 H*   0.059 H*  (0.000-0.034)  ng/mL


 


Triglycerides     (<150)  mg/dL


 


HDL Cholesterol     (40-60)  mg/dL














  08/30/18 08/31/18 08/31/18 Range/Units





  21:04 05:53 05:53 


 


BUN   19 H   (7-17)  mg/dL


 


POC Glucose (mg/dL)  228 H    (75-99)  mg/dL


 


Total Creatine Kinase     ()  U/L


 


CK-MB (CK-2)     (0.0-2.4)  ng/mL


 


Troponin I    0.055 H*  (0.000-0.034)  ng/mL


 


Triglycerides   381 H   (<150)  mg/dL


 


HDL Cholesterol   32 L   (40-60)  mg/dL














  08/31/18 Range/Units





  11:35 


 


BUN   (7-17)  mg/dL


 


POC Glucose (mg/dL)  242 H  (75-99)  mg/dL


 


Total Creatine Kinase   ()  U/L


 


CK-MB (CK-2)   (0.0-2.4)  ng/mL


 


Troponin I   (0.000-0.034)  ng/mL


 


Triglycerides   (<150)  mg/dL


 


HDL Cholesterol   (40-60)  mg/dL














Assessment and Plan


(1) Acute respiratory failure with hypoxia


Narrative/Plan: 





* Now resolved patient with good saturations on room air


* Likely secondary to underlying ischemic cardiomyopathy and systolic CHF


Current Visit: Yes   Status: Resolved   Code(s): J96.01 - ACUTE RESPIRATORY 

FAILURE WITH HYPOXIA   SNOMED Code(s): 45217002


   





(2) Hypertensive emergency


Narrative/Plan: 





* Not resolved patient's blood pressure under better control


* Continue current regimen on lisinopril and Coreg and Lasix


Current Visit: Yes   Status: Resolved   Code(s): I16.1 - HYPERTENSIVE EMERGENCY

   SNOMED Code(s): 829447496750021


   





(3) Type 2 diabetes mellitus with hyperglycemia


Narrative/Plan: 





* A1c pending 


* continue home insulin regimen Levemir 76 subcu daily at bedtime with prandial 

insulin 20 units 3 times a day continue with correctional scale coverage


Current Visit: Yes   Status: Acute   Code(s): E11.65 - TYPE 2 DIABETES MELLITUS 

WITH HYPERGLYCEMIA   SNOMED Code(s): 082879543296221


   





(4) Hyperlipidemia


Narrative/Plan: 





* Patient noted to have hyperlipidemia but has an ALLERGY listed to statins


Current Visit: Yes   Status: Acute   Code(s): E78.5 - HYPERLIPIDEMIA, 

UNSPECIFIED   SNOMED Code(s): 38899671


   





(5) Systolic CHF, acute on chronic


Narrative/Plan: 





* Severe cardiomyopathy ejection fraction of 25-30%


* Patient has biventricular pacer with defibrillator


* Continue to optimize medical therapy continue with Coreg and lisinopril


Current Visit: Yes   Status: Acute   Code(s): I50.23 - ACUTE ON CHRONIC 

SYSTOLIC (CONGESTIVE) HEART FAILURE   SNOMED Code(s): 385574341


   





(6) Ischemic cardiomyopathy


Narrative/Plan: 





* Continue with antiplatelet therapy 


* Troponins trending up to 0.059 likely type II demand ischemia


Current Visit: Yes   Status: Acute   Code(s): I25.5 - ISCHEMIC CARDIOMYOPATHY   

SNOMED Code(s): 148173972


   


Plan: 





Disposition


* We'll continue to optimize medical therapy monitor the patient for another 24 

hours and if she is chest pain-free we'll discharge her home safely


* Anticipate discharge in 1-2 days

## 2018-09-01 VITALS — DIASTOLIC BLOOD PRESSURE: 42 MMHG | SYSTOLIC BLOOD PRESSURE: 93 MMHG | TEMPERATURE: 97.7 F | HEART RATE: 68 BPM

## 2018-09-01 VITALS — RESPIRATION RATE: 18 BRPM

## 2018-09-01 LAB
GLUCOSE BLD-MCNC: 184 MG/DL (ref 75–99)
GLUCOSE BLD-MCNC: 81 MG/DL (ref 75–99)

## 2018-09-01 RX ADMIN — THERA TABS SCH EACH: TAB at 09:14

## 2018-09-01 RX ADMIN — CARVEDILOL SCH MG: 12.5 TABLET, FILM COATED ORAL at 06:12

## 2018-09-01 RX ADMIN — FUROSEMIDE SCH MG: 40 TABLET ORAL at 09:14

## 2018-09-01 RX ADMIN — INSULIN HUMAN SCH UNIT: 100 INJECTION, SUSPENSION SUBCUTANEOUS at 11:51

## 2018-09-01 RX ADMIN — POTASSIUM CHLORIDE SCH MEQ: 20 TABLET, EXTENDED RELEASE ORAL at 09:14

## 2018-09-01 RX ADMIN — INSULIN HUMAN SCH UNIT: 100 INJECTION, SUSPENSION SUBCUTANEOUS at 07:04

## 2018-09-01 RX ADMIN — LISINOPRIL SCH MG: 2.5 TABLET ORAL at 09:14

## 2018-09-01 RX ADMIN — INSULIN ASPART SCH: 100 INJECTION, SOLUTION INTRAVENOUS; SUBCUTANEOUS at 06:11

## 2018-09-01 RX ADMIN — ASPIRIN 325 MG ORAL TABLET SCH MG: 325 PILL ORAL at 09:14

## 2018-09-01 RX ADMIN — DIGOXIN SCH MCG: 125 TABLET ORAL at 09:14

## 2018-09-01 RX ADMIN — INSULIN ASPART SCH UNIT: 100 INJECTION, SOLUTION INTRAVENOUS; SUBCUTANEOUS at 11:51

## 2018-09-01 NOTE — P.DS
Providers


Date of admission: 


08/30/18 11:10





Expected date of discharge: 09/01/18


Attending physician: 


Carlos Carrizales MD





Consults: 





 





08/30/18 09:42


Consult Physician Routine 


   Consulting Provider: Andrews Victoria


   Consult Reason/Comments: chf exacerbation


   Do you want consulting provider notified?: Yes











Primary care physician: 


Stated None








- Discharge Diagnosis(es)


(1) Acute respiratory failure with hypoxia


Current Visit: Yes   Status: Resolved   





(2) Hypertensive emergency


Current Visit: Yes   Status: Resolved   





(3) Type 2 diabetes mellitus with hyperglycemia


Current Visit: Yes   Status: Acute   





(4) Hyperlipidemia


Current Visit: Yes   Status: Acute   





(5) Systolic CHF, acute on chronic


Current Visit: Yes   Status: Acute   





(6) Ischemic cardiomyopathy


Current Visit: Yes   Status: Acute   


Hospital Course: 





The patient is a 63-year-old  female with history of coronary artery 

disease with previous bypass surgery and a history of ischemic cardiac myopathy 

with a biventricular pacer and defibrillator who presented to the ER with 

dyspnea is admitted for acute respiratory failure with hypoxia secondary to 

likely acute systolic CHF exacerbation, her NT proBNP was mildly elevated at 448

, chest x-ray only showed chronic changes without evidence of acute pulmonary 

disease despite her history of flash pulmonary edema.  She denied any chest 

pain but was noted to be in hypertensive urgency on arrival via EMS, she was 

started on nitroglycerin which normalized her blood pressure.  EKG showed 

evidence of biventricular paced rhythm,  The patient was noted to have 

elevation of troponin as high as 0.059.  Echocardiogram done here confirmed a 

systolic CHF with ejection fraction of 25-30%.  The patient was seen by 

cardiology determined that the elevated troponin was secondary to likely demand 

ischemic type 2 event.  Her troponin trended down and she was subsequently 

discharged home in stable condition being chest pain or dyspnea free over the 

last 24 hours.  This discharge process took approximately 35 minutes





Constitutional: No acute distress, conversant, pleasant





Eyes: Anicteric sclerae, moist conjunctiva, no lid-lag, PERRLA





ENMT: NC/AT,Oropharynx clear, no erythema, exudates





Neck:Supple, FROM, no masses, or JVD, No carotid bruits; No thyromegaly





Lungs: Clear to auscultation, Clear to percussion, Normal respiratory effort, 

no accessory muscle use 





Cardiovascular: Heart regular in rate and rhythm,  No murmurs, gallops, or rubs 

no peripheral edema





Abdominal: Soft Nontender, nom distended, no guarding, no rebound or  rigidity, 

Normoactive bowel sounds No hepatomegaly, No splenomegaly,  No palpable mass No 

abdominal wall hernia noted 





Skin: Normal temperature, tone, texture, turgor, No induration No subcutaneous 

nodules, No rash, lesions, No ulcers





Extremities:No digital cyanosis No clubbing, Pedal pulses intact and  

symmetrical Radial pulses intact and symmetrical Normal gait and station, No 

calf tenderness


 


Psychiatric: Alert and oriented to person, place and time, Appropriate affect 

Intact judgement   


      


Neuro: Muscles Strength 5/5 in all 4 extremities, Sensation to light touch 

grossly present throughout, Cranial nerves II-XII grossly intact. No focal 

sensory deficits





Patient Condition at Discharge: Good





Plan - Discharge Summary


Discharge Rx Participant: No


New Discharge Prescriptions: 


Continue


   Warfarin [Coumadin] 7.5 mg PO MOTH


   Warfarin [Coumadin] 5 mg PO SUTUWEFRSA


   Potassium Chloride ER [K-Dur 20] 20 meq PO DAILY


   Omega-3 Fatty Acids [Omega-3] 1,000 mg PO BID


   Multivitamins, Thera [Multivitamin (formulary)] 1 tab PO DAILY


   Lisinopril [Zestril] 2.5 mg PO DAILY


   Insulin NPH Human Isophane [humuLIN N Kwikpen] 20 unit SQ AC-TID


   Insulin Detemir [Levemir Flextouch] 76 units SQ HS


   Furosemide [Lasix] 40 mg PO DAILY


   Digoxin [Lanoxin] 125 mcg PO DAILY


   Carvedilol [Coreg] 12.5 mg PO BID


Discharge Medication List





Carvedilol [Coreg] 12.5 mg PO BID 08/30/18 [History]


Digoxin [Lanoxin] 125 mcg PO DAILY 08/30/18 [History]


Furosemide [Lasix] 40 mg PO DAILY 08/30/18 [History]


Insulin Detemir [Levemir Flextouch] 76 units SQ HS 08/30/18 [History]


Insulin NPH Human Isophane [humuLIN N Kwikpen] 20 unit SQ AC-TID 08/30/18 [

History]


Lisinopril [Zestril] 2.5 mg PO DAILY 08/30/18 [History]


Multivitamins, Thera [Multivitamin (formulary)] 1 tab PO DAILY 08/30/18 [History

]


Omega-3 Fatty Acids [Omega-3] 1,000 mg PO BID 08/30/18 [History]


Potassium Chloride ER [K-Dur 20] 20 meq PO DAILY 08/30/18 [History]


Warfarin [Coumadin] 5 mg PO SUTUWEFRSA 08/30/18 [History]


Warfarin [Coumadin] 7.5 mg PO MOTH 08/30/18 [History]








Follow up Appointment(s)/Referral(s): 


None,Stated [Primary Care Provider] - 1-2 days

## 2018-09-01 NOTE — P.PN
Subjective


Progress Note Date: 09/01/18





This is a 63-year-old female with history of ischemic heart disease with 

previous bypass surgery, probable cardiomyopathy with history of biventricular 

pacemaker implantation with a defibrillator, who came to [place] to the 

[facility] on jury duty.  Apparently patient became short of breath and went to 

sit in the library.  This shortness of breath did not improve and patient 

called paramedics and came to the hospital.  Patient was extremely short of 

breath on arrival.  Chest x-ray showed some chronic changes.  Her BNP is not 

elevated.  However, patient responded well to IV Lasix.  Patient blood pressure 

was also very high.  IV nitroglycerin dropped her blood pressure to normal 

range.  Currently, patient is off nitroglycerin.  She seemed to be comfortable, 

sleeping flat in bed.  No evidence of JVD.  Lungs appeared to be clear.  

Troponins 0.014, 0.05, 0.05, 0.05.  She denies having any chest discomfort 

today and her breathing overall is stable.  Blood pressure 120/60 with a heart 

rate in the 80s, 92% on room air.  We will continue to monitor the patient, she 

wishes to follow-up with her cardiologist on discharge, we'll plan on sending 

her home in 24 hours if stable.








09/01/2018


Patient seen and examined this morning, feels well, denies any chest pain, 

breathing overall is stable.  She will be discharged home today to follow-up 

with her cardiologist in the next one week.





Objective





- Vital Signs


Vital signs: 


 Vital Signs











Temp  97.7 F   09/01/18 12:00


 


Pulse  68   09/01/18 12:00


 


Resp  18   09/01/18 12:00


 


BP  93/42   09/01/18 12:00


 


Pulse Ox  94 L  09/01/18 12:00








 Intake & Output











 08/31/18 09/01/18 09/01/18





 18:59 06:59 18:59


 


Intake Total 180 300 236


 


Balance 180 300 236


 


Weight 79.5 kg 80.2 kg 


 


Intake:   


 


  Oral 180 300 236


 


Other:   


 


  Voiding Method Toilet Toilet Toilet


 


  # Voids 4 1 














- Exam





GENERAL EXAM: Patient is alert and oriented and doesn't appear to be in any 

acute distress


HEENT: Normocephalic. Normal reaction of pupils, equal size, normal range of 

extraocular motion. No erythema or exudates in the throat.


NECK: No masses, no nuchal rigidity.


CHEST: No chest wall deformity.


LUNGS: Equal air entry with no crackles or wheeze.


HEART: S1 and S2 normal with no audible mumurs or gallops. Regular rhythm, 

femorals equal on both sides..


ABDOMEN: No hepatosplenomegaly, normal bowel sounds, no guarding or rigidity.


SKIN: No rashes


CENTRAL NERVOUS SYSTEM: No focal deficits.


EXTREMITIES: No cyanosis, clubbing or edema.








- Labs


CBC & Chem 7: 


 08/31/18 05:53





 08/31/18 05:53


Labs: 


 Abnormal Lab Results - Last 24 Hours (Table)











  08/30/18 08/31/18 08/31/18 Range/Units





  09:15 17:04 20:49 


 


POC Glucose (mg/dL)   145 H  197 H  (75-99)  mg/dL


 


Hemoglobin A1c  7.8 H    (4.0-6.0)  %














  09/01/18 Range/Units





  11:45 


 


POC Glucose (mg/dL)  184 H  (75-99)  mg/dL


 


Hemoglobin A1c   (4.0-6.0)  %








 Microbiology - Last 24 Hours (Table)











 08/31/18 05:53 Blood Culture - Preliminary





 Blood    No Growth after 24 hours














Assessment and Plan


Plan: 


Assessment and plan


#1 systolic congestive heart failure acute on chronic


#2 ischemic cardiomyopathy


#3 history of IV AICD


#4 hyperlipidemia


#5 hypertension


#6 paroxysmal atrial fibrillation


#7 diabetes


#8 coronary disease with prior bypass surgery


#9 abnormal troponins, not consistent with acute coronary syndrome, likely 

secondary to supply and demand mismatch.








Plan


Echocardiogram with Doppler study revealed an ejection fraction of 25-30%.  

Patient may be able to be discharged home today.  She can follow-up with her 

cardiologist post discharge.


DNP note has been reviewed, I agree with a documented findings and plan of 

care.  Patient was seen and examined.